# Patient Record
Sex: FEMALE | Race: OTHER | HISPANIC OR LATINO | URBAN - METROPOLITAN AREA
[De-identification: names, ages, dates, MRNs, and addresses within clinical notes are randomized per-mention and may not be internally consistent; named-entity substitution may affect disease eponyms.]

---

## 2019-08-06 ENCOUNTER — EMERGENCY (EMERGENCY)
Facility: HOSPITAL | Age: 39
LOS: 1 days | Discharge: HOME | End: 2019-08-06
Attending: EMERGENCY MEDICINE
Payer: SELF-PAY

## 2019-08-06 VITALS
TEMPERATURE: 98 F | DIASTOLIC BLOOD PRESSURE: 94 MMHG | SYSTOLIC BLOOD PRESSURE: 153 MMHG | OXYGEN SATURATION: 96 % | RESPIRATION RATE: 16 BRPM | HEART RATE: 86 BPM

## 2019-08-06 VITALS
HEIGHT: 61 IN | DIASTOLIC BLOOD PRESSURE: 93 MMHG | RESPIRATION RATE: 18 BRPM | WEIGHT: 179.9 LBS | OXYGEN SATURATION: 98 % | HEART RATE: 85 BPM | TEMPERATURE: 99 F | SYSTOLIC BLOOD PRESSURE: 138 MMHG

## 2019-08-06 PROCEDURE — 99283 EMERGENCY DEPT VISIT LOW MDM: CPT

## 2019-08-06 RX ORDER — VALACYCLOVIR 500 MG/1
1000 TABLET, FILM COATED ORAL ONCE
Refills: 0 | Status: COMPLETED | OUTPATIENT
Start: 2019-08-06 | End: 2019-08-06

## 2019-08-06 RX ORDER — VALACYCLOVIR 500 MG/1
1 TABLET, FILM COATED ORAL
Qty: 21 | Refills: 0
Start: 2019-08-06 | End: 2019-08-12

## 2019-08-06 RX ADMIN — VALACYCLOVIR 1000 MILLIGRAM(S): 500 TABLET, FILM COATED ORAL at 11:54

## 2019-08-06 RX ADMIN — Medication 40 MILLIGRAM(S): at 11:54

## 2019-08-06 NOTE — ED PROVIDER NOTE - NS ED ROS FT
General: denies fever, chills  HENT: denies nasal congestion, rhinorrhea  CV: denies chest pain, palpitations  Resp: denies difficulty breathing, cough  Abdominal: denies nausea, vomiting, diarrhea, abdominal pain  MSK: denies muscle aches, leg swelling  Neuro: denies headaches, + L facial numbness and weakness  Skin: denies rashes, bruises

## 2019-08-06 NOTE — ED PROVIDER NOTE - NSFOLLOWUPINSTRUCTIONS_ED_ALL_ED_FT
Pino’s Palsy    Bell’s palsy is a condition in which the muscles on one side of the face become paralyzed. This often causes one side of the face to droop. It is a common condition and many people recover completely. Causes include viral infections but most of the time the reason remains unknown. Signs and symptoms include not being able to raise your eyebrow, not being able to close your eye, drooping of the eyelid and corner of the mouth, sensitivity to loud noises, dryness of the eye, change in taste, and not being able to close your mouth and drooling. Take medicines only as directed by your health care provider. If your eye is affected, use moisturizing eye drops to prevent drying of your eye and tape your eyelid shut at night.    SEEK IMMEDIATE MEDICAL CARE IF YOU HAVE ANY OF THE FOLLOWING SYMPTOMS: weakness or numbness in another part of your body, difficulty swallowing, fever, or neck pain.    - Follow up with your primary care doctor in 1-2 days.    - Bring results with you to the appointment.   - Take tylenol 650mg or motrin 600mg every 6 hours for pain or fever.   - Return to the ED for new or worsening symptoms.

## 2019-08-06 NOTE — ED PROVIDER NOTE - OBJECTIVE STATEMENT
39F w/ no PMH presents w/ sudden onset L sided facial weakness. States she had epigastric pain w/ vomiting a few days ago. Yesterday she notice the L side of her face was not moving. Denies fevers, chills, weakness or numbness in her extremities.

## 2019-08-06 NOTE — ED PROVIDER NOTE - ATTENDING CONTRIBUTION TO CARE
Patient presenting complaining of facial weakness/numbness on L.  Began Sunday.  Having some dribbling of liquids/foods from mouth as she is numb.  Tearing of L eye.  Had abdominal pains and vomiting prior to onset which has now resolved.  Otherwise healthy, no medical history.    A 14 point review of systems is negative except as in HPI or otherwise documented.    Exam:  General: Patient well appearing, vital signs within normal limits  HEENT: airway patent with moist mucous membranes  Cardiac: RRR S1/S2 with strong peripheral pulses  Respiratory: lungs clear without respiratory distress  GI: abdomen soft, non tender, non distended  Neuro: L facial droop, forehead and upper eyelids involved, otherwise neurologically intact  Skin: warm, well perfused  Psych: normal mood and affect    Presentation consistent with Bell's Palsy, will start steroids/valcyclovir/artificial tears and have follow up with neurology as outpatient.

## 2019-08-06 NOTE — ED PROVIDER NOTE - CLINICAL SUMMARY MEDICAL DECISION MAKING FREE TEXT BOX
39F w/ Bell's Palsy, TMs clear, upper and lower motor neuron involvement, treat w/ valacyclovir and prednisone

## 2019-08-06 NOTE — ED ADULT NURSE NOTE - OBJECTIVE STATEMENT
1050 39 yr old HF c/o right facial droop and numbness x 24 hrs. 2 days ago N/V. Upon wakening the  next day 1050 39 yr old HF c/o right facial droop and numbness x 24 hrs. 2 days ago N/V. Upon wakening the  next day woke up with R facial droop and numbness. A&Ox4. ambulatory. PERRL. Denies HA or dizziness. +right facial droop

## 2019-08-06 NOTE — ED PROVIDER NOTE - PHYSICAL EXAMINATION
CONSTITUTIONAL: NAD, awake, alert  HEAD: Normocephalic; atraumatic  EYES: EOMI, no nystagmus  ENMT: MMM, TMs clear   NECK: no tenderness, FROM  CARD: Normal Sl, S2; no audible murmurs,rubs  RESP: normal wob, lungs ctab  ABD: soft, non-distended; non-tender  EXT: no edema, normal ROM in all four extremities  SKIN: Warm, dry, no rashes  NEURO: aaox3, moving all extremities spontaneously, L side of face w/ decreased sensation to the R, L facial droop involving forehead and lower face